# Patient Record
Sex: FEMALE | Race: WHITE | NOT HISPANIC OR LATINO | Employment: FULL TIME | URBAN - METROPOLITAN AREA
[De-identification: names, ages, dates, MRNs, and addresses within clinical notes are randomized per-mention and may not be internally consistent; named-entity substitution may affect disease eponyms.]

---

## 2017-08-16 ENCOUNTER — APPOINTMENT (OUTPATIENT)
Dept: ADMISSIONS | Facility: MEDICAL CENTER | Age: 37
End: 2017-08-16
Attending: OTOLARYNGOLOGY
Payer: COMMERCIAL

## 2017-08-16 RX ORDER — MONTELUKAST SODIUM 10 MG/1
10 TABLET ORAL DAILY
COMMUNITY

## 2017-08-16 RX ORDER — ACETAMINOPHEN 325 MG/1
650 TABLET ORAL EVERY 4 HOURS PRN
Status: ON HOLD | COMMUNITY
End: 2017-08-23

## 2017-08-16 RX ORDER — DIPHENHYDRAMINE HCL 25 MG
25 TABLET ORAL EVERY 6 HOURS PRN
COMMUNITY

## 2017-08-16 RX ORDER — RANITIDINE 150 MG/1
150 TABLET ORAL DAILY
COMMUNITY

## 2017-08-23 ENCOUNTER — HOSPITAL ENCOUNTER (OUTPATIENT)
Facility: MEDICAL CENTER | Age: 37
End: 2017-08-23
Attending: OTOLARYNGOLOGY | Admitting: OTOLARYNGOLOGY
Payer: COMMERCIAL

## 2017-08-23 VITALS
TEMPERATURE: 97.8 F | RESPIRATION RATE: 16 BRPM | DIASTOLIC BLOOD PRESSURE: 58 MMHG | BODY MASS INDEX: 27.67 KG/M2 | OXYGEN SATURATION: 94 % | HEIGHT: 62 IN | SYSTOLIC BLOOD PRESSURE: 93 MMHG | WEIGHT: 150.35 LBS | HEART RATE: 81 BPM

## 2017-08-23 PROBLEM — J34.2 DEVIATED SEPTUM: Status: ACTIVE | Noted: 2017-08-23

## 2017-08-23 LAB
B-HCG FREE SERPL-ACNC: <5 MIU/ML
IHCGL IHCGL: NEGATIVE MIU/ML

## 2017-08-23 PROCEDURE — 501838 HCHG SUTURE GENERAL: Performed by: OTOLARYNGOLOGY

## 2017-08-23 PROCEDURE — 700101 HCHG RX REV CODE 250

## 2017-08-23 PROCEDURE — 502692 HCHG DRESSING, NASOPORE 8CM: Performed by: OTOLARYNGOLOGY

## 2017-08-23 PROCEDURE — 160048 HCHG OR STATISTICAL LEVEL 1-5: Performed by: OTOLARYNGOLOGY

## 2017-08-23 PROCEDURE — 160009 HCHG ANES TIME/MIN: Performed by: OTOLARYNGOLOGY

## 2017-08-23 PROCEDURE — 700102 HCHG RX REV CODE 250 W/ 637 OVERRIDE(OP)

## 2017-08-23 PROCEDURE — 160036 HCHG PACU - EA ADDL 30 MINS PHASE I: Performed by: OTOLARYNGOLOGY

## 2017-08-23 PROCEDURE — 500331 HCHG COTTONOID, SURG PATTIE: Performed by: OTOLARYNGOLOGY

## 2017-08-23 PROCEDURE — 160029 HCHG SURGERY MINUTES - 1ST 30 MINS LEVEL 4: Performed by: OTOLARYNGOLOGY

## 2017-08-23 PROCEDURE — A9270 NON-COVERED ITEM OR SERVICE: HCPCS

## 2017-08-23 PROCEDURE — 500125 HCHG BOVIE, HANDLE: Performed by: OTOLARYNGOLOGY

## 2017-08-23 PROCEDURE — 500076 HCHG BLADE, CURVED 4.0: Performed by: OTOLARYNGOLOGY

## 2017-08-23 PROCEDURE — 160041 HCHG SURGERY MINUTES - EA ADDL 1 MIN LEVEL 4: Performed by: OTOLARYNGOLOGY

## 2017-08-23 PROCEDURE — 84702 CHORIONIC GONADOTROPIN TEST: CPT

## 2017-08-23 PROCEDURE — 160002 HCHG RECOVERY MINUTES (STAT): Performed by: OTOLARYNGOLOGY

## 2017-08-23 PROCEDURE — 160035 HCHG PACU - 1ST 60 MINS PHASE I: Performed by: OTOLARYNGOLOGY

## 2017-08-23 PROCEDURE — 700111 HCHG RX REV CODE 636 W/ 250 OVERRIDE (IP)

## 2017-08-23 PROCEDURE — 501404 HCHG SPLINT, NASAL DOYLE AIRWAY: Performed by: OTOLARYNGOLOGY

## 2017-08-23 PROCEDURE — 502573 HCHG PACK, ENT: Performed by: OTOLARYNGOLOGY

## 2017-08-23 RX ORDER — BACITRACIN ZINC 500 [USP'U]/G
OINTMENT TOPICAL
Status: DISCONTINUED
Start: 2017-08-23 | End: 2017-08-23 | Stop reason: HOSPADM

## 2017-08-23 RX ORDER — BACITRACIN ZINC 500 [USP'U]/G
OINTMENT TOPICAL
Status: DISCONTINUED | OUTPATIENT
Start: 2017-08-23 | End: 2017-08-23 | Stop reason: HOSPADM

## 2017-08-23 RX ORDER — AMOXICILLIN 500 MG/1
500 CAPSULE ORAL 3 TIMES DAILY
Qty: 21 CAP | Refills: 0 | Status: SHIPPED | OUTPATIENT
Start: 2017-08-23

## 2017-08-23 RX ORDER — OXYCODONE HCL 5 MG/5 ML
SOLUTION, ORAL ORAL
Status: COMPLETED
Start: 2017-08-23 | End: 2017-08-23

## 2017-08-23 RX ORDER — ONDANSETRON 4 MG/1
4 TABLET, ORALLY DISINTEGRATING ORAL EVERY 6 HOURS PRN
Qty: 10 TAB | Refills: 1 | Status: SHIPPED | OUTPATIENT
Start: 2017-08-23

## 2017-08-23 RX ORDER — ONDANSETRON 2 MG/ML
INJECTION INTRAMUSCULAR; INTRAVENOUS
Status: COMPLETED
Start: 2017-08-23 | End: 2017-08-23

## 2017-08-23 RX ORDER — HYDROCODONE BITARTRATE AND ACETAMINOPHEN 7.5; 325 MG/1; MG/1
1-2 TABLET ORAL EVERY 4 HOURS PRN
Qty: 62 TAB | Refills: 0 | Status: SHIPPED | OUTPATIENT
Start: 2017-08-23

## 2017-08-23 RX ORDER — LIDOCAINE HYDROCHLORIDE AND EPINEPHRINE 10; 10 MG/ML; UG/ML
INJECTION, SOLUTION INFILTRATION; PERINEURAL
Status: DISCONTINUED | OUTPATIENT
Start: 2017-08-23 | End: 2017-08-23 | Stop reason: HOSPADM

## 2017-08-23 RX ORDER — SODIUM CHLORIDE, SODIUM LACTATE, POTASSIUM CHLORIDE, CALCIUM CHLORIDE 600; 310; 30; 20 MG/100ML; MG/100ML; MG/100ML; MG/100ML
INJECTION, SOLUTION INTRAVENOUS CONTINUOUS
Status: DISCONTINUED | OUTPATIENT
Start: 2017-08-23 | End: 2017-08-23 | Stop reason: HOSPADM

## 2017-08-23 RX ORDER — HALOPERIDOL 5 MG/ML
INJECTION INTRAMUSCULAR
Status: COMPLETED
Start: 2017-08-23 | End: 2017-08-23

## 2017-08-23 RX ADMIN — ONDANSETRON 4 MG: 2 INJECTION INTRAMUSCULAR; INTRAVENOUS at 17:30

## 2017-08-23 RX ADMIN — OXYCODONE HYDROCHLORIDE 10 MG: 5 SOLUTION ORAL at 18:00

## 2017-08-23 RX ADMIN — FENTANYL CITRATE 50 MCG: 50 INJECTION, SOLUTION INTRAMUSCULAR; INTRAVENOUS at 17:30

## 2017-08-23 RX ADMIN — FENTANYL CITRATE 50 MCG: 50 INJECTION, SOLUTION INTRAMUSCULAR; INTRAVENOUS at 18:15

## 2017-08-23 RX ADMIN — HALOPERIDOL LACTATE 1 MG: 5 INJECTION, SOLUTION INTRAMUSCULAR at 19:00

## 2017-08-23 ASSESSMENT — PAIN SCALES - GENERAL
PAINLEVEL_OUTOF10: 0
PAINLEVEL_OUTOF10: 5
PAINLEVEL_OUTOF10: 0
PAINLEVEL_OUTOF10: 2
PAINLEVEL_OUTOF10: 4
PAINLEVEL_OUTOF10: 5
PAINLEVEL_OUTOF10: 5
PAINLEVEL_OUTOF10: 4
PAINLEVEL_OUTOF10: 4

## 2017-08-23 NOTE — IP AVS SNAPSHOT
8/23/2017    Estrella Nick  [address]  [facility] [address]    Dear Estrella:    [facility] wants to ensure your discharge home is safe and you or your loved ones have had all of your questions answered regarding your care after you leave the hospital.    Below is a list of resources and contact information should you have any questions regarding your hospital stay, follow-up instructions, or active medical symptoms.    Questions or Concerns Regarding… Contact   Medical Questions Related to Your Discharge  (7 days a week, 8am-5pm) Contact a Nurse Care Coordinator   [phone]   Medical Questions Not Related to Your Discharge  (24 hours a day / 7 days a week)  Contact the Nurse Health Line   [phone]    Medications or Discharge Instructions Refer to your discharge packet   or contact your [facility] Primary Care Provider   [phone]   Follow-up Appointment(s) Schedule your appointment via [employer]   or contact Scheduling [phone]   Billing Review your statement via [employer]  or contact Billing [phone]   Medical Records Review your records via [employer]   or contact Medical Records [phone]     You may receive a telephone call within two days of discharge. This call is to make certain you understand your discharge instructions and have the opportunity to have any questions answered. You can also easily access your medical information, test results and upcoming appointments via the [employer] free online health management tool. You can learn more and sign up at [email or web address]. For assistance setting up your [employer] account, please call [phone].    Once again, we want to ensure your discharge home is safe and that you have a clear understanding of any next steps in your care. If you have any questions or concerns, please do not hesitate to contact us, we are here for you. Thank you for choosing [facility] for your healthcare needs.    Sincerely,    Your [facility] Healthcare Team

## 2017-08-23 NOTE — IP AVS SNAPSHOT
" Home Care Instructions                                                                                                                Name:Estrella Nick  Medical Record Number:[id number]  CSN: [id number]    YOB: 1980   Age: 36 y.o.  Sex: female  HT:1.575 m (5' 2\") WT: 68.2 kg (150 lb 5.7 oz)          Admit Date: 8/23/2017     Discharge Date:   Today's Date: 8/23/2017  Attending Doctor:  Lisa Robertson M.D.                  Allergies:  Sulfa drugs                Discharge Instructions         ACTIVITY: Rest and take it easy for the first 24 hours.  A responsible adult is recommended to remain with you during that time.  It is normal to feel sleepy.  We encourage you to not do anything that requires balance, judgment or coordination.    MILD FLU-LIKE SYMPTOMS ARE NORMAL. YOU MAY EXPERIENCE GENERALIZED MUSCLE ACHES, THROAT IRRITATION, HEADACHE AND/OR SOME NAUSEA.    FOR 24 HOURS DO NOT:  Drive, operate machinery or run household appliances.  Drink beer or alcoholic beverages.   Make important decisions or sign legal documents.    SPECIAL INSTRUCTIONS: *Refer to Dr. Robertson's discharge instructions**    DIET: To avoid nausea, slowly advance diet as tolerated, avoiding spicy or greasy foods for the first day.  Add more substantial food to your diet according to your physician's instructions.  Babies can be fed formula or breast milk as soon as they are hungry.  INCREASE FLUIDS AND FIBER TO AVOID CONSTIPATION.    SURGICAL DRESSING/BATHING: *May shower in 24 hours**    FOLLOW-UP APPOINTMENT:  A follow-up appointment should be arranged with your doctor in *as scheduled**.    You should CALL YOUR PHYSICIAN if you develop:  Fever greater than 101 degrees F.  Pain not relieved by medication, or persistent nausea or vomiting.  Excessive bleeding (blood soaking through dressing) or unexpected drainage from the wound.  Extreme redness or swelling around the incision site, drainage of pus or foul smelling " drainage.  Inability to urinate or empty your bladder within 8 hours.  Problems with breathing or chest pain.    You should call 911 if you develop problems with breathing or chest pain.  If you are unable to contact your doctor or surgical center, you should go to the nearest emergency room or urgent care center.    Physician's telephone #: *Dr. Robertson [phone]**    If any questions arise, call your doctor.  If your doctor is not available, please feel free to call the Surgical Center at [phone].  The Center is open Monday through Friday from 7AM to 7PM.  You can also call the [employer] HOTLINE open 24 hours/day, 7 days/week and speak to a nurse at [phone], or toll free at [phone].    A registered nurse may call you a few days after your surgery to see how you are doing after your procedure.    MEDICATIONS: Resume taking daily medication.  Take prescribed pain medication with food.  If no medication is prescribed, you may take non-aspirin pain medication if needed.  PAIN MEDICATION CAN BE VERY CONSTIPATING.  Take a stool softener or laxative such as senokot, pericolace, or milk of magnesia if needed.    Prescription given for *patient has prescriptions**.  Last pain medication given at *6:00 pm; next dose due at 10:00 pm**.    If your physician has prescribed pain medication that includes Acetaminophen (Tylenol), do not take additional Acetaminophen (Tylenol) while taking the prescribed medication.    Depression / Suicide Risk    As you are discharged from this [facility] facility, it is important to learn how to keep safe from harming yourself.    Recognize the warning signs:  · Abrupt changes in personality, positive or negative- including increase in energy   · Giving away possessions  · Change in eating patterns- significant weight changes-  positive or negative  · Change in sleeping patterns- unable to sleep or sleeping all the time   · Unwillingness or inability to  communicate  · Depression  · Unusual sadness, discouragement and loneliness  · Talk of wanting to die  · Neglect of personal appearance   · Rebelliousness- reckless behavior  · Withdrawal from people/activities they love  · Confusion- inability to concentrate     If you or a loved one observes any of these behaviors or has concerns about self-harm, here's what you can do:  · Talk about it- your feelings and reasons for harming yourself  · Remove any means that you might use to hurt yourself (examples: pills, rope, extension cords, firearm)  · Get professional help from the community (Mental Health, Substance Abuse, psychological counseling)  · Do not be alone:Call your Safe Contact- someone whom you trust who will be there for you.  · Call your local CRISIS HOTLINE 921-9245 or 379-176-5165  · Call your local Children's Mobile Crisis Response Team [place] (103) 738-7369 or www.[employer]  · Call the toll free National Suicide Prevention Hotlines   · National Suicide Prevention Lifeline 889-758-MXSO (3525)  · National Hope Line Network 800-SUICIDE (869-5355)       Medication List      START taking these medications        Instructions    Morning Afternoon Evening Bedtime    amoxicillin 500 MG Caps   Commonly known as:  AMOXIL        Take 1 Cap by mouth 3 times a day.   Dose:  500 mg                        hydrocodone-acetaminophen 7.5-325 MG per tablet   Commonly known as:  NORCO        Take 1-2 Tabs by mouth every four hours as needed.   Dose:  1-2 Tab                        ondansetron 4 MG Tbdp   Commonly known as:  ZOFRAN ODT        Take 1 Tab by mouth every 6 hours as needed for Nausea/Vomiting.   Dose:  4 mg                          CONTINUE taking these medications        Instructions    Morning Afternoon Evening Bedtime    diphenhydrAMINE 25 MG Tabs   Commonly known as:  BENADRYL        Take 25 mg by mouth every 6 hours as needed for Sleep.   Dose:  25 mg                        FABB PO        Take   by mouth.                        FLONASE 50 MCG/ACT nasal spray   Generic drug:  fluticasone        Spray 1 Spray in nose every day. Each Nostril   Dose:  1 Spray                        montelukast 10 MG Tabs   Commonly known as:  SINGULAIR        Take 10 mg by mouth every day.   Dose:  10 mg                        ranitidine 150 MG Tabs   Commonly known as:  ZANTAC        Take 150 mg by mouth every day.   Dose:  150 mg                        SYNTHROID 50 MCG Tabs   Generic drug:  levothyroxine        Take 50 mcg by mouth every day.   Dose:  50 mcg                        Vitamin D3 04458 units Tabs        Take 1 Tab by mouth every 72 hours.   Dose:  1 Tab                          STOP taking these medications     acetaminophen 325 MG Tabs   Commonly known as:  TYLENOL                    Where to Get Your Medications      You can get these medications from any pharmacy     Bring a paper prescription for each of these medications    - amoxicillin 500 MG Caps  - hydrocodone-acetaminophen 7.5-325 MG per tablet  - ondansetron 4 MG Tbdp            Medication Information     Above and/or attached are the medications your physician expects you to take upon discharge. Review all of your home medications and newly ordered medications with your doctor and/or pharmacist. Follow medication instructions as directed by your doctor and/or pharmacist. Please keep your medication list with you and share with your physician. Update the information when medications are discontinued, doses are changed, or new medications (including over-the-counter products) are added; and carry medication information at all times in the event of emergency situations.        Resources     Quit Smoking / Tobacco Use:    I understand the use of any tobacco products increases my chance of suffering from future heart disease or stroke and could cause other illnesses which may shorten my life. Quitting the use of tobacco products is the single most important  thing I can do to improve my health. For further information on smoking / tobacco cessation call a Toll Free Quit Line at 1-926.346.9568 (*National [employer] [place]) or 1-709.142.2366 (American Lung Association) or you can access the web based program at www.lungusa.org.    [place] Tobacco Users Help Line:  (486) 894-9917       Toll Free: 1-583.681.7278  Quit Tobacco Program [facility] (285)823-7592    Crisis Hotline:    [place] Crisis Hotline:  3-733-DAYSXTR or 1-980.529.1860    [place] Crisis Hotline:    1-720.985.7471 or 825-665-7593    Discharge Survey:   Thank you for choosing [facility]. We hope we did everything we could to make your hospital stay a pleasant one. You may be receiving a survey and we would appreciate your time and participation in answering the questions. Your input is very valuable to us in our efforts to improve our service to our patients and their families.            Signatures     My signature on this form indicates that:    1. I acknowledge receipt and understanding of these Home Care Instruction.  2. My questions regarding this information have been answered to my satisfaction.  3. I have formulated a plan with my discharge nurse to obtain my prescribed medications for home.    __________________________________      __________________________________                   Patient Signature                                 Guardian/Responsible Adult Signature      __________________________________                 __________       ________                       Nurse Signature                                               Date                 Time

## 2017-08-24 NOTE — OR SURGEON
Operative Report    PreOp Diagnosis: Right maxillary and ethmoid sinusitis, septal deviation, turbinated hypertrophy    PostOp Diagnosis: Same    Procedure(s):  ETHMOIDECTOMY-ENDOSCOPIC ANTERIOR  - Wound Class: Clean Contaminated  ANTROSTOMY - ENDOSCOPIC MAXILLARY  - Wound Class: Clean Contaminated  SEPTOPLASTY - Wound Class: Clean Contaminated  TURBINOPLASTY - Wound Class: Clean Contaminated    Surgeon(s):  Lisa Robertson M.D.    Anesthesiologist/Type of Anesthesia:  Anesthesiologist: Vincent Ro M.D./General    Surgical Staff:  Circulator: Eliane Horner R.N.  Scrub Person: Yandy Hicks    Specimens:  * No specimens in log *    Estimated Blood Loss: minimal    Findings: right septal deviation    Complications: none        8/23/2017 5:24 PM Lisa Robertson

## 2017-08-24 NOTE — DISCHARGE INSTRUCTIONS
ACTIVITY: Rest and take it easy for the first 24 hours.  A responsible adult is recommended to remain with you during that time.  It is normal to feel sleepy.  We encourage you to not do anything that requires balance, judgment or coordination.    MILD FLU-LIKE SYMPTOMS ARE NORMAL. YOU MAY EXPERIENCE GENERALIZED MUSCLE ACHES, THROAT IRRITATION, HEADACHE AND/OR SOME NAUSEA.    FOR 24 HOURS DO NOT:  Drive, operate machinery or run household appliances.  Drink beer or alcoholic beverages.   Make important decisions or sign legal documents.    SPECIAL INSTRUCTIONS: *Refer to Dr. Robertson's discharge instructions**    DIET: To avoid nausea, slowly advance diet as tolerated, avoiding spicy or greasy foods for the first day.  Add more substantial food to your diet according to your physician's instructions.  Babies can be fed formula or breast milk as soon as they are hungry.  INCREASE FLUIDS AND FIBER TO AVOID CONSTIPATION.    SURGICAL DRESSING/BATHING: *May shower in 24 hours**    FOLLOW-UP APPOINTMENT:  A follow-up appointment should be arranged with your doctor in *as scheduled**.    You should CALL YOUR PHYSICIAN if you develop:  Fever greater than 101 degrees F.  Pain not relieved by medication, or persistent nausea or vomiting.  Excessive bleeding (blood soaking through dressing) or unexpected drainage from the wound.  Extreme redness or swelling around the incision site, drainage of pus or foul smelling drainage.  Inability to urinate or empty your bladder within 8 hours.  Problems with breathing or chest pain.    You should call 911 if you develop problems with breathing or chest pain.  If you are unable to contact your doctor or surgical center, you should go to the nearest emergency room or urgent care center.    Physician's telephone #: *Dr. Robertson [phone]**    If any questions arise, call your doctor.  If your doctor is not available, please feel free to call the Surgical Center at [phone].  The  Center is open Monday through Friday from 7AM to 7PM.  You can also call the HEALTH HOTLINE open 24 hours/day, 7 days/week and speak to a nurse at [phone], or toll free at [phone].    A registered nurse may call you a few days after your surgery to see how you are doing after your procedure.    MEDICATIONS: Resume taking daily medication.  Take prescribed pain medication with food.  If no medication is prescribed, you may take non-aspirin pain medication if needed.  PAIN MEDICATION CAN BE VERY CONSTIPATING.  Take a stool softener or laxative such as senokot, pericolace, or milk of magnesia if needed.    Prescription given for *patient has prescriptions**.  Last pain medication given at *6:00 pm; next dose due at 10:00 pm**.    If your physician has prescribed pain medication that includes Acetaminophen (Tylenol), do not take additional Acetaminophen (Tylenol) while taking the prescribed medication.    Depression / Suicide Risk    As you are discharged from this [facility] facility, it is important to learn how to keep safe from harming yourself.    Recognize the warning signs:  · Abrupt changes in personality, positive or negative- including increase in energy   · Giving away possessions  · Change in eating patterns- significant weight changes-  positive or negative  · Change in sleeping patterns- unable to sleep or sleeping all the time   · Unwillingness or inability to communicate  · Depression  · Unusual sadness, discouragement and loneliness  · Talk of wanting to die  · Neglect of personal appearance   · Rebelliousness- reckless behavior  · Withdrawal from people/activities they love  · Confusion- inability to concentrate     If you or a loved one observes any of these behaviors or has concerns about self-harm, here's what you can do:  · Talk about it- your feelings and reasons for harming yourself  · Remove any means that you might use to hurt yourself (examples: pills, rope, extension cords,  firearm)  · Get professional help from the community (Mental Health, Substance Abuse, psychological counseling)  · Do not be alone:Call your Safe Contact- someone whom you trust who will be there for you.  · Call your local CRISIS HOTLINE 216-2408 or 557-479-0935  · Call your local Children's Mobile Crisis Response Team [place] (606) 555-7250 or www.[employer]  · Call the toll free National Suicide Prevention Hotlines   · National Suicide Prevention Lifeline 113-387-GCWX (6392)  · National Hope Line Network 800-SUICIDE (783-6670)

## 2017-08-24 NOTE — OR NURSING
REPORT FROM FRANCO DAVIS.  PATIENT UP TO BATHROOM.  VOID WITHOUT PROBLEM.  DRESSED AND IN RECLINER.  MINIMAL OOZING FROM NOSE.  DISCHARGE INSTRUCTIONS TO PATIENT AND .  PATIENT FEELS READY FOR DISCHARGE.  ALL QUESTIONS ANSWERED.

## 2017-08-24 NOTE — OR NURSING
1708 Received pt from OR, received report from Dr. Ro. Pt sleeping, oral airway in place. No bleeding observed from nose. VS WNL.     1710 Oral airway dc'd without difficulty.     1730 Pt medicated with zofran for nausea and fent for pain 6/10 to sinuses. Nasal sling applied with gauze for scant bleeding from nose.     1755 Pt medicated with oxy for pain 6/10 to sinuses. Pt.'s nasal dressing changed, moderate bleeding observed.     1815 Pt medicated with fent for pain to sinuses 6/10, nasal dressing changed for moderate sanguinous drainage.     1900 Pt medicated with haldol for nausea.     1905 Report to Gabe DAVIS.

## 2017-08-24 NOTE — OP REPORT
DATE OF SERVICE:  08/23/2017    PREOPERATIVE DIAGNOSES:  Maxillary and ethmoid sinusitis, septal deviation and   turbinate hypertrophy.    POSTOPERATIVE DIAGNOSES:  Maxillary and ethmoid sinusitis, septal deviation   and turbinate hypertrophy.    PROCEDURE:  Septoturbinoplasty and anterior ethmoidectomy with right maxillary   antrostomy.    SURGEON:  Lisa Robertson MD    ANESTHESIOLOGIST:  Vincent Ro MD    COMPLICATIONS:  None.    PROCEDURE:  The patient was properly identified and taken to the operating   room where she was laid in supine position.  General anesthesia was induced   and endotracheal tube was placed.  The patient was then positioned in reverse   Trendelenburg.  Inspection of her nose showed severe septal deviation to the   right with large turbinates bilaterally.  At this time, 1% lidocaine was   injected in the anterior septum bilaterally as well as the inferior turbinate   and the middle meatus on the right side.  A total of approximately 4-5 mL was   used, after which cocaine pledgets were placed bilaterally.  After prepping   and draping the patient and allowing for local time, the pledgets were   removed.  An anterior septal incision was made on the left anterior septum and   taken down to the perichondrium using a 15 blade.  A subperichondrial flap   elevated using a freer and a Cecile elevator.  Crossover incision was made using   a 15 blade about 2 cm posterior to the caudal end of the cartilage crossed   over into the right side of the cartilage, elevating the perichondrial flap   off the cartilage and going all the way back to the bony vomer.  Pieces of cartilage on the   floor of the nose were elevated and released, freeing up the septum nicely.  Attention was then turned to the right middle meatus.    The middle turbinate was mediallized and the uncinate process was pulled forward.  The anterior ethmoids were then   also entered using a straight [place] using microdebrider.  This  area was   then widely opened.  She does have a small retention cyst in the   right maxillary sinus, which was removed using a 45                      Blakesley.  The area was then irrigated and suctioned.  Microdebrider was then   used to remove the inferior portion of the inferior turbinates                     using a 0-degree telescope for both the sinus and the turbinates.  Suction   cautery was then used to stop any bleeding seen on the inferior turbinates.    Nasal port was placed in the right middle meatus and along the floor of the   nose.  The anterior septum was closed using interrupted 4-0 chromic and then   the septum was quilted closed using a running 4-0 chromic.  Wilson splints were   placed bilaterally and secured in position using a 3-0 nylon suture.  The   mouth was irrigated and suctioned.  The patient was unprepped and draped,   awakened, extubated, returned to recovery room in stable satisfactory   condition.       ____________________________________     MD ANGELINA Khalil / YAIMA    DD:  08/24/2017 07:55:58  DT:  08/24/2017 08:32:03    D#:  [id number]  Job#:  [id number]

## 2018-01-23 ENCOUNTER — APPOINTMENT (RX ONLY)
Dept: URBAN - METROPOLITAN AREA CLINIC 31 | Facility: CLINIC | Age: 38
Setting detail: DERMATOLOGY
End: 2018-01-23

## 2018-01-23 DIAGNOSIS — L73.2 HIDRADENITIS SUPPURATIVA: ICD-10-CM

## 2018-01-23 DIAGNOSIS — L57.8 OTHER SKIN CHANGES DUE TO CHRONIC EXPOSURE TO NONIONIZING RADIATION: ICD-10-CM

## 2018-01-23 DIAGNOSIS — L81.4 OTHER MELANIN HYPERPIGMENTATION: ICD-10-CM

## 2018-01-23 DIAGNOSIS — L30.1 DYSHIDROSIS [POMPHOLYX]: ICD-10-CM

## 2018-01-23 DIAGNOSIS — D22 MELANOCYTIC NEVI: ICD-10-CM

## 2018-01-23 DIAGNOSIS — L21.8 OTHER SEBORRHEIC DERMATITIS: ICD-10-CM

## 2018-01-23 DIAGNOSIS — D18.0 HEMANGIOMA: ICD-10-CM

## 2018-01-23 PROBLEM — D22.5 MELANOCYTIC NEVI OF TRUNK: Status: ACTIVE | Noted: 2018-01-23

## 2018-01-23 PROBLEM — D18.01 HEMANGIOMA OF SKIN AND SUBCUTANEOUS TISSUE: Status: ACTIVE | Noted: 2018-01-23

## 2018-01-23 PROCEDURE — ? ADDITIONAL NOTES

## 2018-01-23 PROCEDURE — ? PRESCRIPTION

## 2018-01-23 PROCEDURE — ? OBSERVATION AND MEASURE

## 2018-01-23 PROCEDURE — 99213 OFFICE O/P EST LOW 20 MIN: CPT

## 2018-01-23 PROCEDURE — ? COUNSELING

## 2018-01-23 RX ORDER — DOXYCYCLINE HYCLATE 100 MG/1
CAPSULE, GELATIN COATED ORAL
Qty: 60 | Refills: 4 | Status: ERX | COMMUNITY
Start: 2018-01-23

## 2018-01-23 RX ADMIN — DOXYCYCLINE HYCLATE: 100 CAPSULE, GELATIN COATED ORAL at 23:11

## 2018-01-23 ASSESSMENT — LOCATION ZONE DERM
LOCATION ZONE: VULVA
LOCATION ZONE: SCALP
LOCATION ZONE: ARM
LOCATION ZONE: FACE
LOCATION ZONE: TRUNK
LOCATION ZONE: FEET

## 2018-01-23 ASSESSMENT — LOCATION SIMPLE DESCRIPTION DERM
LOCATION SIMPLE: ABDOMEN
LOCATION SIMPLE: RIGHT FOREARM
LOCATION SIMPLE: RIGHT SCALP
LOCATION SIMPLE: LEFT LOWER BACK
LOCATION SIMPLE: RIGHT CHEEK
LOCATION SIMPLE: LEFT FOREARM
LOCATION SIMPLE: GROIN
LOCATION SIMPLE: CHEST
LOCATION SIMPLE: LEFT CHEEK
LOCATION SIMPLE: LEFT PLANTAR SURFACE
LOCATION SIMPLE: RIGHT LOWER BACK

## 2018-01-23 ASSESSMENT — LOCATION DETAILED DESCRIPTION DERM
LOCATION DETAILED: RIGHT SUPERIOR MEDIAL MIDBACK
LOCATION DETAILED: LEFT DISTAL DORSAL FOREARM
LOCATION DETAILED: LEFT MEDIAL PLANTAR MIDFOOT
LOCATION DETAILED: RIGHT INFERIOR CENTRAL MALAR CHEEK
LOCATION DETAILED: LEFT INFERIOR MEDIAL MIDBACK
LOCATION DETAILED: RIGHT DISTAL DORSAL FOREARM
LOCATION DETAILED: LEFT INFERIOR CENTRAL MALAR CHEEK
LOCATION DETAILED: LEFT PROXIMAL DORSAL FOREARM
LOCATION DETAILED: UPPER STERNUM
LOCATION DETAILED: RIGHT MEDIAL FRONTAL SCALP
LOCATION DETAILED: STERNAL NOTCH
LOCATION DETAILED: MONS PUBIS
LOCATION DETAILED: RIGHT PROXIMAL DORSAL FOREARM
LOCATION DETAILED: LEFT RIB CAGE

## 2018-01-23 NOTE — PROCEDURE: ADDITIONAL NOTES
Additional Notes: Probable diagnosis for concern on intake
Additional Notes: Advised on alert alternating 2 different types of dandruff shampoo. If patient decides to consider steroid solution instead she is to call office for rx.

## 2018-01-24 RX ORDER — CLOBETASOL PROPIONATE 0.5 MG/G
OINTMENT TOPICAL
Qty: 1 | Refills: 0 | Status: ERX | COMMUNITY
Start: 2018-01-24

## 2018-01-24 RX ADMIN — CLOBETASOL PROPIONATE: 0.5 OINTMENT TOPICAL at 00:49

## 2021-01-06 ENCOUNTER — OFFICE VISIT (OUTPATIENT)
Dept: URGENT CARE | Facility: CLINIC | Age: 41
End: 2021-01-06
Payer: COMMERCIAL

## 2021-01-06 VITALS — HEIGHT: 62 IN | WEIGHT: 160 LBS | BODY MASS INDEX: 29.44 KG/M2

## 2021-09-14 ENCOUNTER — HOSPITAL ENCOUNTER (OUTPATIENT)
Dept: LAB | Facility: MEDICAL CENTER | Age: 41
End: 2021-09-14
Attending: PHYSICIAN ASSISTANT
Payer: COMMERCIAL

## 2021-09-14 LAB
T4 FREE SERPL-MCNC: 1.33 NG/DL (ref 0.93–1.7)
TSH SERPL DL<=0.005 MIU/L-ACNC: 2.05 UIU/ML (ref 0.38–5.33)

## 2021-09-14 PROCEDURE — 36415 COLL VENOUS BLD VENIPUNCTURE: CPT

## 2021-09-14 PROCEDURE — 84443 ASSAY THYROID STIM HORMONE: CPT

## 2021-09-14 PROCEDURE — 84439 ASSAY OF FREE THYROXINE: CPT

## 2023-10-19 ENCOUNTER — OFFICE VISIT (OUTPATIENT)
Dept: OPHTHALMOLOGY | Facility: MEDICAL CENTER | Age: 43
End: 2023-10-19
Payer: COMMERCIAL

## 2023-10-19 DIAGNOSIS — H57.11 RETRO-ORBITAL PAIN OF RIGHT EYE: ICD-10-CM

## 2023-10-19 DIAGNOSIS — H53.9 VISUAL DISTURBANCE: ICD-10-CM

## 2023-10-19 DIAGNOSIS — E07.9 THYROID DISEASE: ICD-10-CM

## 2023-10-19 PROCEDURE — 99204 OFFICE O/P NEW MOD 45 MIN: CPT | Mod: 25 | Performed by: STUDENT IN AN ORGANIZED HEALTH CARE EDUCATION/TRAINING PROGRAM

## 2023-10-19 PROCEDURE — 92083 EXTENDED VISUAL FIELD XM: CPT | Performed by: STUDENT IN AN ORGANIZED HEALTH CARE EDUCATION/TRAINING PROGRAM

## 2023-10-19 PROCEDURE — 92250 FUNDUS PHOTOGRAPHY W/I&R: CPT | Performed by: STUDENT IN AN ORGANIZED HEALTH CARE EDUCATION/TRAINING PROGRAM

## 2023-10-19 RX ORDER — GABAPENTIN 100 MG/1
CAPSULE ORAL
COMMUNITY
Start: 2023-09-22

## 2023-10-19 RX ORDER — ESCITALOPRAM OXALATE 20 MG/1
TABLET ORAL
COMMUNITY
Start: 2023-10-02

## 2023-10-19 RX ORDER — TRIAMCINOLONE ACETONIDE 55 UG/1
1 SPRAY, METERED NASAL DAILY
COMMUNITY

## 2023-10-19 ASSESSMENT — CONF VISUAL FIELD
OS_NORMAL: 1
OS_SUPERIOR_NASAL_RESTRICTION: 0
OS_SUPERIOR_TEMPORAL_RESTRICTION: 0
OS_INFERIOR_NASAL_RESTRICTION: 0
OS_INFERIOR_TEMPORAL_RESTRICTION: 0
OD_SUPERIOR_NASAL_RESTRICTION: 0
OD_SUPERIOR_TEMPORAL_RESTRICTION: 0
OD_NORMAL: 1
OD_INFERIOR_TEMPORAL_RESTRICTION: 0
OD_INFERIOR_NASAL_RESTRICTION: 0

## 2023-10-19 ASSESSMENT — TONOMETRY
OD_IOP_MMHG: 14
IOP_METHOD: I-CARE
OS_IOP_MMHG: 13

## 2023-10-19 ASSESSMENT — MARGIN REFLEX DISTANCE
OD_MRD1: 4
OS_MRD2: 5
OS_MRD1: 4
OD_MRD2: 5

## 2023-10-19 ASSESSMENT — REFRACTION_WEARINGRX
OS_SPHERE: -2.50
OD_AXIS: 094
OD_CYLINDER: +0.75
OD_SPHERE: +0.25
OS_AXIS: 166
OS_CYLINDER: -0.50
SPECS_TYPE: SVL

## 2023-10-19 ASSESSMENT — VISUAL ACUITY
CORRECTION_TYPE: GLASSES
METHOD: SNELLEN - LINEAR
OD_SC: J1+
OD_CC: 20/20
OS_CC+: -1
OS_CC: 20/20
OS_SC: 20/30
OS_SC: J1+
OD_SC: 20/25

## 2023-10-19 ASSESSMENT — REFRACTION_MANIFEST
OD_AXIS: 003
OD_SPHERE: -0.25
OD_CYLINDER: +0.75
OS_CYLINDER: +1.25
METHOD_AUTOREFRACTION: 1
OS_AXIS: 005
OS_SPHERE: -0.50

## 2023-10-19 ASSESSMENT — CUP TO DISC RATIO
OD_RATIO: 0.5
OS_RATIO: 0.5

## 2023-10-19 ASSESSMENT — SLIT LAMP EXAM - LIDS
COMMENTS: NORMAL
COMMENTS: NORMAL

## 2023-10-19 ASSESSMENT — ENCOUNTER SYMPTOMS
DIZZINESS: 1
EYE PAIN: 1
BLURRED VISION: 1
HEADACHES: 1

## 2023-10-19 NOTE — ASSESSMENT & PLAN NOTE
Reports R retrorbital pain beginning 2 months prior  Pt reports history of migraine headaches which are typically posterior occipital.     Exam demonstrates no resistance to retropulsion, proptosis, and EOM restriction. CTH reported normal.     Discussed with patient that given normal examination, possible R retro-orbital pain is a new migraine presentation. Pt pending MRI brain. Will follow up with Neurology

## 2023-10-19 NOTE — PROCEDURES
OD: non specific misses  VFI 99%  MD -1.54  PSD 1.58    OS: non specific misses, borderline reliability  VFI 97%  MD -3.25  PSD 2.04

## 2023-10-19 NOTE — ASSESSMENT & PLAN NOTE
History of hypothyroid on synthroid 50  TSH 2.05  FT4 1.33  Thyroid stimulating immunoglobulin 101  -stable , no evidence of thyroid orbitopathy

## 2023-10-19 NOTE — ASSESSMENT & PLAN NOTE
Pt reports blurred vision OU with R retrorbital eye pain. Given history of hypothyroid she was referred for evaluation of thyroid orbitopathy. Blurred vision reported at distance only. No double vision. Reports redness and swelling of eyes, however positive for allergies and gritty sensation in eyes.     Exam: normal afferent and efferent function. 20/20 VA with correction at distance. HVF demonstrates non specific misses OS, however borderline reliability. RNFL 84 OD and 79 OS (mild thinning temporally otherwise normal). No Apd or exam findings to be suggestive for optic neuropathy OS. External exam does not demonstrate increased resistance to retropulsion, eyelid edema, proptosis.     Plan:   -Discussed with patient that examination is not concerning for thyroid orbitopathy at this time. R retrorbital pain may be migraine headache. distance visual acuity  20/20 OU. no findings concerning for optic neuropathy at this time. -Episodes of swelling and conjunctival injection may be secondary to allergies given gritty sensation and history  -Continue headache management with neurology  -Artificial tears routinely and Pataday prn   -RTC in 6 months to assess stability, sooner if symptoms develop

## 2023-10-19 NOTE — PROGRESS NOTES
Peds/Neuro Ophthalmology:   Kenyon Oconnor M.D.    Date & Time note created:    10/19/2023   1:45 PM     Referring MD / APRN:  Kt Ace M.D., No att. providers found    Patient ID:  Name:             Estrella Nick   YOB: 1980  Age:                 42 y.o.  female   MRN:               [id number]    Chief Complaint/Reason for Visit:     Thyroid Problem (New patient evaluation for both eyes)      History of Present Illness:    Estrella Nick is a 42 y.o. female   New patient for Thyroid Eye Diease.     Pt reports 2 months of R retrorbital pain and blurred vision OU. The blurred vision is only at distance. She is able to read fine at near. She was seen by ENT who initially ordered a CTH. She was then referred to Neurology. MRI brain has been ordered for 11/17/23. Patient feels her eyes are more gritty, puffy and irrigated than normal. Pt states her eye are always dry, watery, itchy, red, and burn. She does use Refresh and Pataday which does not seem to help her symptoms. She does feel there is a change in her appearance. She denies any double vision. She has computer glasses however she feels this makes her more nauseous.     Patient reports chronic headaches located in the posterior occipital region. She has recently seen a neurologist who ordered a MRI brain. The right retro-orbital pain is new over the past 2 months     Dr Eliane Pollard Endocrinology- stable thyroid function          Review of Systems:  Review of Systems   Eyes:  Positive for blurred vision and pain.        ROSSY in both eyes  Dry eyes OU   Neurological:  Positive for dizziness and headaches.       Past Medical History:   Past Medical History:   Diagnosis Date    Allergy     Chronic sinusitis     Hemorrhagic disorder (HCC)     bleeding post tonsils     Hx of angioedema     Thyroid disease        Past Surgical History:  Past Surgical History:   Procedure Laterality Date    ETHMOIDECTOMY Right 8/23/2017    Procedure:  ETHMOIDECTOMY-ENDOSCOPIC ANTERIOR ;  Surgeon: Lisa Robertson M.D.;  Location: SURGERY SAME DAY [facility];  Service:     ANTROSTOMY Right 8/23/2017    Procedure: ANTROSTOMY - ENDOSCOPIC MAXILLARY ;  Surgeon: Lisa Robertson M.D.;  Location: SURGERY SAME DAY [facility];  Service:     SEPTOPLASTY Right 8/23/2017    Procedure: SEPTOPLASTY;  Surgeon: Lisa Robertson M.D.;  Location: SURGERY SAME DAY [facility];  Service:     TURBINOPLASTY Bilateral 8/23/2017    Procedure: TURBINOPLASTY;  Surgeon: Lisa Robertson M.D.;  Location: SURGERY SAME DAY [facility];  Service:     HERNIA REPAIR  3/2013    CHOLECYSTECTOMY  2013    TONSILLECTOMY  7/2010    LAPAROSCOPY  2007    EYE SURGERY  2006    laser surgery on both eyes       Current Outpatient Medications:  Current Outpatient Medications   Medication Sig Dispense Refill    gabapentin (NEURONTIN) 100 MG Cap       triamcinolone (NASACORT) 55 MCG/ACT nasal inhaler Administer 1 Spray into each nostril every day.      escitalopram (LEXAPRO) 20 MG tablet       diphenhydrAMINE (BENADRYL) 25 MG Tab Take 25 mg by mouth every 6 hours as needed for Sleep.      montelukast (SINGULAIR) 10 MG Tab Take 10 mg by mouth every day.      Folic Acid-Vit B6-Vit B12 (FABB PO) Take  by mouth.      Cholecalciferol (VITAMIN D3) 82251 UNITS Tab Take 1 Tab by mouth every 72 hours.      levothyroxine (SYNTHROID) 50 MCG TABS Take 50 mcg by mouth every day.      methylPREDNISolone (MEDROL DOSEPAK) 4 MG Tablet Therapy Pack Follow schedule on package instructions. 21 Tablet 0    methocarbamol (ROBAXIN) 750 MG Tab Take 1 Tablet by mouth 3 times a day as needed (muscle spasm). (Patient not taking: Reported on 10/19/2023) 50 Tablet 0    amoxicillin (AMOXIL) 500 MG Cap Take 1 Cap by mouth 3 times a day. (Patient not taking: Reported on 10/19/2023) 21 Cap 0    ondansetron (ZOFRAN ODT) 4 MG TABLET DISPERSIBLE Take 1 Tab by mouth every 6 hours as needed for Nausea/Vomiting. (Patient not  taking: Reported on 10/19/2023) 10 Tab 1    hydrocodone-acetaminophen (NORCO) 7.5-325 MG per tablet Take 1-2 Tabs by mouth every four hours as needed. (Patient not taking: Reported on 10/19/2023) 62 Tab 0    ranitidine (ZANTAC) 150 MG Tab Take 150 mg by mouth every day. (Patient not taking: Reported on 10/19/2023)      fluticasone (FLONASE) 50 MCG/ACT nasal spray Spray 1 Spray in nose every day. Each Nostril (Patient not taking: Reported on 10/19/2023)       No current facility-administered medications for this visit.       Allergies:  Allergies   Allergen Reactions    Sulfabenzamide Anaphylaxis    Cephalexin      Other reaction(s): Not available    Nitrofurantoin Unspecified    Sulfa Drugs Anaphylaxis and Rash     Other reaction(s): Unknown       Family History:  Family History   Problem Relation Age of Onset    Hypertension Mother        Social History:  Social History     Socioeconomic History    Marital status:      Spouse name: Not on file    Number of children: Not on file    Years of education: Not on file    Highest education level: Not on file   Occupational History    Not on file   Tobacco Use    Smoking status: Never    Smokeless tobacco: Never   Substance and Sexual Activity    Alcohol use: Yes     Comment: 2 drinks per week     Drug use: No    Sexual activity: Yes   Other Topics Concern    Not on file   Social History Narrative    Not on file     Social Determinants of Health     Financial Resource Strain: Not on file   Food Insecurity: Not on file   Transportation Needs: Not on file   Physical Activity: Not on file   Stress: Not on file   Social Connections: Not on file   Intimate Partner Violence: Not on file   Housing Stability: Not on file          Physical Exam:  Physical Exam    Oriented x 3  Weight/BMI: There is no height or weight on file to calculate BMI.  There were no vitals taken for this visit.    Base Eye Exam       Visual Acuity (Snellen - Linear)         Right Left    Dist sc  20/25 20/30    Dist cc 20/20 20/20 -1    Near sc J1+ J1+      Correction: Glasses              Tonometry ([facility], 1:15 PM)         Right Left    Pressure 14 13              Pupils         Pupils Dark Light Shape React APD    Right PERRL 4 2 Round Brisk None    Left PERRL 4 2 Round Brisk None              Visual Fields         Right Left     Full Full              Extraocular Movement         Right Left     Full, Ortho Full, Ortho              Neuro/Psych       Oriented x3: Yes    Mood/Affect: Normal                  Additional Tests       Color         Right Left    Ishihara 12/12 12/12              Stereo       Fly: +    Animals: 3/3    Circles: 7/9                  Slit Lamp and Fundus Exam       External Exam         Right Left    External Normal, normal resistance to retropulsion Normal, normal resistance to retropulsion    MRD1 4 mm 4 mm    MRD2 5 mm 5 mm              Exophthalmometry (Base: 117 mm)         Right Left    Leydi 19 mm 18 mm              Slit Lamp Exam         Right Left    Lids/Lashes Normal Normal    Conjunctiva/Sclera White and quiet White and quiet    Cornea Clear Clear    Anterior Chamber Deep and quiet Deep and quiet    Iris Round and reactive Round and reactive    Lens Clear Clear              Fundus Exam         Right Left    Disc Normal Normal    C/D Ratio 0.5 0.5    Macula Normal Normal                  Refraction       Wearing Rx         Sphere Cylinder Axis    Right +0.25 +0.75 094    Left -2.50 -0.50 166      Age: 1yr    Type: SVL              Manifest Refraction (Auto)         Sphere Cylinder Axis    Right -0.25 +0.75 003    Left -0.50 +1.25 005                    Pertinent Lab/Test/Imaging Review:   MRI brain 3/20/2016: no acute intracranial abnormality     Assessment and Plan:     Thyroid disease  History of hypothyroid on synthroid 50  TSH 2.05  FT4 1.33  Thyroid stimulating immunoglobulin 101  -stable , no evidence of thyroid orbitopathy         Kenyon Oconnor M.D.   97

## 2024-02-21 ENCOUNTER — HOSPITAL ENCOUNTER (OUTPATIENT)
Dept: RADIOLOGY | Facility: MEDICAL CENTER | Age: 44
End: 2024-02-21
Attending: OBSTETRICS & GYNECOLOGY
Payer: COMMERCIAL

## 2024-02-21 DIAGNOSIS — R10.2 PELVIC PAIN SYNDROME: ICD-10-CM

## 2024-02-21 PROCEDURE — 76830 TRANSVAGINAL US NON-OB: CPT

## 2024-04-25 ENCOUNTER — APPOINTMENT (OUTPATIENT)
Dept: OPHTHALMOLOGY | Facility: MEDICAL CENTER | Age: 44
End: 2024-04-25
Payer: COMMERCIAL

## 2024-05-07 ENCOUNTER — APPOINTMENT (OUTPATIENT)
Dept: OPHTHALMOLOGY | Facility: MEDICAL CENTER | Age: 44
End: 2024-05-07
Payer: COMMERCIAL

## 2024-05-07 DIAGNOSIS — G43.709 CHRONIC MIGRAINE WITHOUT AURA WITHOUT STATUS MIGRAINOSUS, NOT INTRACTABLE: ICD-10-CM

## 2024-05-07 DIAGNOSIS — E07.9 THYROID DISEASE: ICD-10-CM

## 2024-05-07 ASSESSMENT — ENCOUNTER SYMPTOMS
HEADACHES: 1
BLURRED VISION: 1
EYE PAIN: 1
DIZZINESS: 1

## 2024-05-07 ASSESSMENT — CONF VISUAL FIELD
OD_INFERIOR_TEMPORAL_RESTRICTION: 0
OS_SUPERIOR_TEMPORAL_RESTRICTION: 0
OD_SUPERIOR_TEMPORAL_RESTRICTION: 0
OD_INFERIOR_NASAL_RESTRICTION: 0
OD_NORMAL: 1
OD_SUPERIOR_NASAL_RESTRICTION: 0
OS_INFERIOR_TEMPORAL_RESTRICTION: 0
OS_NORMAL: 1
OS_INFERIOR_NASAL_RESTRICTION: 0
OS_SUPERIOR_NASAL_RESTRICTION: 0

## 2024-05-07 ASSESSMENT — VISUAL ACUITY
OD_CC: 20/20
METHOD: SNELLEN - LINEAR
OD_CC+: -1
OS_CC: 20/15

## 2024-05-07 ASSESSMENT — TONOMETRY
OS_IOP_MMHG: 12
OD_IOP_MMHG: 14
IOP_METHOD: ICARE

## 2024-05-07 ASSESSMENT — LAGOPHTHALMOS
OS_LAGOPHTHALMOS: 0
OD_LAGOPHTHALMOS: 0

## 2024-05-07 ASSESSMENT — CUP TO DISC RATIO
OS_RATIO: 0.5
OD_RATIO: 0.5

## 2024-05-07 ASSESSMENT — SLIT LAMP EXAM - LIDS
COMMENTS: NORMAL
COMMENTS: NORMAL

## 2024-05-07 NOTE — PROGRESS NOTES
Peds/Neuro Ophthalmology:   Kenyon Oconnor M.D.    Date & Time note created:    5/7/2024   3:59 PM     Referring MD / APRN:  Kt Ace M.D., No att. providers found    Patient ID:  Name:             Estrella Nick   YOB: 1980  Age:                 42 y.o.  female   MRN:               [id number]    Chief Complaint/Reason for Visit:     Follow-Up (6 mo fv for thgyroid eye disease. )      History of Present Illness:      Estrella Nick is a 42 y.o. woman who presents for follow up of possible thyroid eye disease. She was last seen 10/19/23    At her last appt, exam was normal with no evidence of thyroid orbitopathy. She denies any issues with redness of the eyes, proptosis, pain with eye movements, blurred vision, double vision. Patient has continued to struggle with headaches and neck pain. Due to her persistent neck pain patient underwent myelogram (unable to get MRI due to braces), which demonstrated some disc bulge. She experienced post LP headaches requiring a blood patch.    Onset: 4-6 months ago  Frequency: daily  Characteristic: Generalized pressure  Severity: 6/10  Associated symptoms: +light/sound sensitivity, nausea,+R occipital tenderness, +neck pain   Abortive: Imitrex prn, Meloxicam 2 days a week  Preventive: Magnesium 100-200mg daily    As a recap,   Pt reports 2 months of R retrorbital pain and blurred vision OU. The blurred vision is only at distance. She is able to read fine at near. She was seen by ENT who initially ordered a CTH. She was then referred to Neurology. MRI brain has been ordered for 11/17/23. Patient feels her eyes are more gritty, puffy and irrigated than normal. Pt states her eye are always dry, watery, itchy, red, and burn. She does use Refresh and Pataday which does not seem to help her symptoms. She does feel there is a change in her appearance. She denies any double vision. She has computer glasses however she feels this makes her more nauseous.      Patient reports chronic headaches located in the posterior occipital region. She has recently seen a neurologist who ordered a MRI brain. The right retro-orbital pain is new over the past 2 months     Dr Eliane Pollard Endocrinology- stable thyroid function              Review of Systems:  Review of Systems   Eyes:  Positive for blurred vision and pain.        ROSSY in both eyes  Dry eyes OU   Neurological:  Positive for dizziness and headaches.       Past Medical History:   Past Medical History:   Diagnosis Date    Allergy     Chronic sinusitis     Hemorrhagic disorder (HCC)     bleeding post tonsils     Hx of angioedema     Thyroid disease        Past Surgical History:  Past Surgical History:   Procedure Laterality Date    ETHMOIDECTOMY Right 8/23/2017    Procedure: ETHMOIDECTOMY-ENDOSCOPIC ANTERIOR ;  Surgeon: Lisa Robertson M.D.;  Location: SURGERY SAME DAY [facility];  Service:     ANTROSTOMY Right 8/23/2017    Procedure: ANTROSTOMY - ENDOSCOPIC MAXILLARY ;  Surgeon: Lisa Robertson M.D.;  Location: SURGERY SAME DAY [facility];  Service:     SEPTOPLASTY Right 8/23/2017    Procedure: SEPTOPLASTY;  Surgeon: Lisa Robertson M.D.;  Location: SURGERY SAME DAY [facility];  Service:     TURBINOPLASTY Bilateral 8/23/2017    Procedure: TURBINOPLASTY;  Surgeon: Lisa Robertson M.D.;  Location: SURGERY SAME DAY [facility];  Service:     HERNIA REPAIR  3/2013    CHOLECYSTECTOMY  2013    TONSILLECTOMY  7/2010    LAPAROSCOPY  2007    EYE SURGERY  2006    laser surgery on both eyes       Current Outpatient Medications:  Current Outpatient Medications   Medication Sig Dispense Refill    meloxicam (MOBIC) 15 MG tablet TAKE ONE TAB PO ONCE A DAY PRN FOR PAIN 30 Tablet 0    gabapentin (NEURONTIN) 100 MG Cap       triamcinolone (NASACORT) 55 MCG/ACT nasal inhaler Administer 1 Spray into each nostril every day.      escitalopram (LEXAPRO) 20 MG tablet       methylPREDNISolone (MEDROL DOSEPAK) 4 MG Tablet  Therapy Pack Follow schedule on package instructions. 21 Tablet 0    methocarbamol (ROBAXIN) 750 MG Tab Take 1 Tablet by mouth 3 times a day as needed (muscle spasm). (Patient not taking: Reported on 10/19/2023) 50 Tablet 0    amoxicillin (AMOXIL) 500 MG Cap Take 1 Cap by mouth 3 times a day. (Patient not taking: Reported on 10/19/2023) 21 Cap 0    ondansetron (ZOFRAN ODT) 4 MG TABLET DISPERSIBLE Take 1 Tab by mouth every 6 hours as needed for Nausea/Vomiting. (Patient not taking: Reported on 10/19/2023) 10 Tab 1    hydrocodone-acetaminophen (NORCO) 7.5-325 MG per tablet Take 1-2 Tabs by mouth every four hours as needed. (Patient not taking: Reported on 10/19/2023) 62 Tab 0    diphenhydrAMINE (BENADRYL) 25 MG Tab Take 25 mg by mouth every 6 hours as needed for Sleep.      montelukast (SINGULAIR) 10 MG Tab Take 10 mg by mouth every day.      ranitidine (ZANTAC) 150 MG Tab Take 150 mg by mouth every day. (Patient not taking: Reported on 10/19/2023)      Folic Acid-Vit B6-Vit B12 (FABB PO) Take  by mouth.      Cholecalciferol (VITAMIN D3) 18384 UNITS Tab Take 1 Tab by mouth every 72 hours.      levothyroxine (SYNTHROID) 50 MCG TABS Take 50 mcg by mouth every day.      fluticasone (FLONASE) 50 MCG/ACT nasal spray Spray 1 Spray in nose every day. Each Nostril (Patient not taking: Reported on 10/19/2023)       No current facility-administered medications for this visit.       Allergies:  Allergies   Allergen Reactions    Sulfabenzamide Anaphylaxis    Cephalexin      Other reaction(s): Not available    Nitrofurantoin Unspecified    Sulfa Drugs Anaphylaxis and Rash     Other reaction(s): Unknown       Family History:  Family History   Problem Relation Age of Onset    Hypertension Mother        Social History:  Social History     Socioeconomic History    Marital status:      Spouse name: Not on file    Number of children: Not on file    Years of education: Not on file    Highest education level: Not on file    Occupational History    Not on file   Tobacco Use    Smoking status: Never    Smokeless tobacco: Never   Substance and Sexual Activity    Alcohol use: Yes     Comment: 2 drinks per week     Drug use: No    Sexual activity: Yes   Other Topics Concern    Not on file   Social History Narrative    Not on file     Social Determinants of Health     Financial Resource Strain: Not on file   Food Insecurity: Not on file   Transportation Needs: Not on file   Physical Activity: Not on file   Stress: Not on file   Social Connections: Not on file   Intimate Partner Violence: Not on file   Housing Stability: Not on file          Physical Exam:  Physical Exam    Oriented x 3  Weight/BMI: There is no height or weight on file to calculate BMI.  There were no vitals taken for this visit.    Base Eye Exam       Visual Acuity (Snellen - Linear)         Right Left    Dist cc 20/20 -1 20/15              Tonometry ([facility], 1:51 PM)         Right Left    Pressure 14 12              Pupils         Dark Light Shape React APD    Right 4 3 Round Brisk None    Left 4 3 Round Brisk None              Visual Fields         Right Left     Full Full              Extraocular Movement         Right Left     Full, Ortho Full, Ortho              Neuro/Psych       Oriented x3: Yes    Mood/Affect: Normal   + left occipital notch tenderness                 Additional Tests       Color         Right Left    Ishihara 11/11 11/11              Stereo       Fly: +    Animals: 3/3    Circles: 4/9                  Slit Lamp and Fundus Exam       External Exam         Right Left    External Normal, normal resistance to retropulsion Normal, normal resistance to retropulsion    Superior scleral show 0 mm 0 mm    Inferior scleral show 0 mm 0 mm    Lagophthalmos 0 mm 0 mm              Slit Lamp Exam         Right Left    Lids/Lashes Normal Normal    Conjunctiva/Sclera White and quiet White and quiet    Cornea Clear Clear    Anterior Chamber Deep and quiet Deep and  quiet    Iris Round and reactive Round and reactive    Lens Clear Clear              Fundus Exam         Right Left    Disc pink, sharp disc margins, flat pink, sharp disc margins, flat    C/D Ratio 0.5 0.5    Macula Normal Normal                    Pertinent Lab/Test/Imaging Review:  MRI brain 3/20/2016: no acute intracranial abnormality     TSH 2.05  FT4 1.33  Thyroid stimulating immunoglobulin 101    Assessment and Plan:     Thyroid disease  History of hypothyroid on synthroid 50  TSH 2.05  FT4 1.33  Thyroid stimulating immunoglobulin 101  JACI 0    Plan:   -stable , no evidence of thyroid orbitopathy     Chronic migraine without aura without status migrainosus, not intractable  Onset: 4-6 months ago  Frequency: daily  Characteristic: Generalized pressure  Severity: 6/10  Associated symptoms: +light/sound sensitivity, nausea,+R occipital tenderness, +neck pain   Abortive: Imitrex prn   Preventive: Magnesium 100-200mg daily  Meloxicam 2 days a week    Plan:   Discussed with patient current headaches are consistent with migraine headache rather than post LP. Recommend preventative therapy such as Magnesium 400-500mg daily or Riboflavin. Pt is currently following with pain management. Given tenderness on palpation of L occipital notch, could consider occipital nerve block. Muscle relaxants may additionally be helpful with cervicalgia.     -RTC in 1 year          Kenyon Oconnor M.D.

## 2024-05-07 NOTE — ASSESSMENT & PLAN NOTE
History of hypothyroid on synthroid 50  TSH 2.05  FT4 1.33  Thyroid stimulating immunoglobulin 101  JACI 0    Plan:   -stable , no evidence of thyroid orbitopathy

## 2024-05-07 NOTE — PATIENT INSTRUCTIONS
"Vitamins and herbs that show potential for migraine prevention:    Magnesium: Magnesium (250 mg twice a day or 500 mg at bed) has a relaxant effect on smooth muscles such as blood vessels. Individuals suffering from frequent or daily headache usually have low magnesium levels which can be increase with daily supplementation of 400-750 mg. Three trials found 40-90% average headache reduction  when used as a preventative. Magnesium also demonstrated the benefit in menstrually related migraine.  Magnesium is part of the messenger system in the serotonin cascade and it is a good muscle relaxant.  It is also useful for constipation which can be a side effect of other medications used to treat migraine. Good sources include nuts, whole grains, and tomatoes.     Riboflavin (vitamin B 2) 200 mg twice a day. This vitamin assists nerve cells in the production of ATP a principal energy storing molecule.  It is necessary for many chemical reactions in the body.  There have been at least 3 clinical trials of riboflavin using 400 mg per day all of which suggested that migraine frequency can be decreased.  All 3 trials showed significant improvement in over half of migraine sufferers.  Food Sources: cereal grasses, whole grains, almonds, sesame seeds, spinach, fortified soy milk, spirulina, mushrooms, beet greens, quinoa, buckwheat, prunes. Most Americans get more riboflavin than the recommended daily allowance, however riboflavin deficiency is not necessary for the supplements to help prevent headache.     Coenzyme Q10: This is present in almost all cells in the body and is critical component for the conversion of energy.  Recent studies have shown that a nutritional supplement of CoQ10 can reduce the frequency of migraine attacks by improving the energy production of cells as with riboflavin.  Doses of 150 mg twice a day have been shown to be effective.       BLUE LIGHT GLASSES  FL-41 \"Blue Light\" Filter - Migraine and " "Photophobia Protection Glasses  Light sensitivity or \"photophobia\" is a common problem in people with migraines, especially during a migraine attack. You can also see this problem following head injuries, like in concussion. Bright lights, especially lights that are heavy in the \"blue\" spectrum, are common triggers for migraines and are used in significant amounts in TVs, fluorescent lights, LED lights and headlamps, computers and phone screens. Specialized lenses can help block that \"blue\" light and reduce your light sensitivity and help prevent a migraine. Not all migraineurs have the same triggers or respond the same to the blue light, so it is difficult to know whether or not you will respond to the use of the F-41 filter lenses. If you chose to try glasses with the F-41 filter, we can recommend a couple of reputable sites that sell the lenses. Less expensive and equally effective lenses may be available from additional sites, although at this time, we are unable to comment on their reliability.    [employer]ww.[employer].com  Www.[employer].com     In an effort to better understand the effectiveness of these lenses, please report your positive or negative results to us for future reference. Thank you.     "

## 2024-05-07 NOTE — ASSESSMENT & PLAN NOTE
Onset: 4-6 months ago  Frequency: daily  Characteristic: Generalized pressure  Severity: 6/10  Associated symptoms: +light/sound sensitivity, nausea,+R occipital tenderness, +neck pain   Abortive: Imitrex prn   Preventive: Magnesium 100-200mg daily  Meloxicam 2 days a week    Plan:   Discussed with patient current headaches are consistent with migraine headache rather than post LP. Recommend preventative therapy such as Magnesium 400-500mg daily or Riboflavin. Pt is currently following with pain management. Given tenderness on palpation of L occipital notch, could consider occipital nerve block. Muscle relaxants may additionally be helpful with cervicalgia.     -RTC in 1 year

## 2024-06-17 ENCOUNTER — HOSPITAL ENCOUNTER (OUTPATIENT)
Dept: LAB | Facility: MEDICAL CENTER | Age: 44
End: 2024-06-17
Attending: INTERNAL MEDICINE
Payer: COMMERCIAL

## 2024-06-17 LAB
ALBUMIN SERPL BCP-MCNC: 4.4 G/DL (ref 3.2–4.9)
ALBUMIN/GLOB SERPL: 1.3 G/DL
ALP SERPL-CCNC: 114 U/L (ref 30–99)
ALT SERPL-CCNC: 30 U/L (ref 2–50)
ANION GAP SERPL CALC-SCNC: 13 MMOL/L (ref 7–16)
AST SERPL-CCNC: 21 U/L (ref 12–45)
BASOPHILS # BLD AUTO: 0.4 % (ref 0–1.8)
BASOPHILS # BLD: 0.03 K/UL (ref 0–0.12)
BILIRUB SERPL-MCNC: 0.3 MG/DL (ref 0.1–1.5)
BUN SERPL-MCNC: 12 MG/DL (ref 8–22)
CALCIUM ALBUM COR SERPL-MCNC: 9.2 MG/DL (ref 8.5–10.5)
CALCIUM SERPL-MCNC: 9.5 MG/DL (ref 8.5–10.5)
CHLORIDE SERPL-SCNC: 101 MMOL/L (ref 96–112)
CO2 SERPL-SCNC: 23 MMOL/L (ref 20–33)
CREAT SERPL-MCNC: 0.56 MG/DL (ref 0.5–1.4)
CRP SERPL HS-MCNC: 0.66 MG/DL (ref 0–0.75)
EOSINOPHIL # BLD AUTO: 0.52 K/UL (ref 0–0.51)
EOSINOPHIL NFR BLD: 7.8 % (ref 0–6.9)
ERYTHROCYTE [DISTWIDTH] IN BLOOD BY AUTOMATED COUNT: 43.2 FL (ref 35.9–50)
ERYTHROCYTE [SEDIMENTATION RATE] IN BLOOD BY WESTERGREN METHOD: 11 MM/HOUR (ref 0–25)
GFR SERPLBLD CREATININE-BSD FMLA CKD-EPI: 115 ML/MIN/1.73 M 2
GLOBULIN SER CALC-MCNC: 3.3 G/DL (ref 1.9–3.5)
GLUCOSE SERPL-MCNC: 109 MG/DL (ref 65–99)
HCT VFR BLD AUTO: 44.7 % (ref 37–47)
HGB BLD-MCNC: 14.9 G/DL (ref 12–16)
IMM GRANULOCYTES # BLD AUTO: 0.02 K/UL (ref 0–0.11)
IMM GRANULOCYTES NFR BLD AUTO: 0.3 % (ref 0–0.9)
LYMPHOCYTES # BLD AUTO: 1.33 K/UL (ref 1–4.8)
LYMPHOCYTES NFR BLD: 19.9 % (ref 22–41)
MCH RBC QN AUTO: 30.2 PG (ref 27–33)
MCHC RBC AUTO-ENTMCNC: 33.3 G/DL (ref 32.2–35.5)
MCV RBC AUTO: 90.5 FL (ref 81.4–97.8)
MONOCYTES # BLD AUTO: 0.63 K/UL (ref 0–0.85)
MONOCYTES NFR BLD AUTO: 9.4 % (ref 0–13.4)
NEUTROPHILS # BLD AUTO: 4.14 K/UL (ref 1.82–7.42)
NEUTROPHILS NFR BLD: 62.2 % (ref 44–72)
NRBC # BLD AUTO: 0 K/UL
NRBC BLD-RTO: 0 /100 WBC (ref 0–0.2)
PLATELET # BLD AUTO: 259 K/UL (ref 164–446)
PMV BLD AUTO: 9.7 FL (ref 9–12.9)
POTASSIUM SERPL-SCNC: 3.9 MMOL/L (ref 3.6–5.5)
PROT SERPL-MCNC: 7.7 G/DL (ref 6–8.2)
RBC # BLD AUTO: 4.94 M/UL (ref 4.2–5.4)
SODIUM SERPL-SCNC: 137 MMOL/L (ref 135–145)
WBC # BLD AUTO: 6.7 K/UL (ref 4.8–10.8)

## 2024-06-17 PROCEDURE — 85025 COMPLETE CBC W/AUTO DIFF WBC: CPT

## 2024-06-17 PROCEDURE — 83088 ASSAY OF HISTAMINE: CPT

## 2024-06-17 PROCEDURE — 83520 IMMUNOASSAY QUANT NOS NONAB: CPT

## 2024-06-17 PROCEDURE — 80053 COMPREHEN METABOLIC PANEL: CPT

## 2024-06-17 PROCEDURE — 84150 ASSAY OF PROSTAGLANDIN: CPT

## 2024-06-17 PROCEDURE — 86140 C-REACTIVE PROTEIN: CPT

## 2024-06-17 PROCEDURE — 85652 RBC SED RATE AUTOMATED: CPT

## 2024-06-17 PROCEDURE — 82785 ASSAY OF IGE: CPT

## 2024-06-17 PROCEDURE — 82542 COL CHROMOTOGRAPHY QUAL/QUAN: CPT | Mod: 91

## 2024-06-17 PROCEDURE — 36415 COLL VENOUS BLD VENIPUNCTURE: CPT

## 2024-06-19 LAB
HISTAMINE SERPL-SCNC: 47 NMOL/L (ref 0–8)
IGE SERPL-ACNC: 44 KU/L
TRYPTASE SERPL-MCNC: 6 UG/L

## 2024-06-25 LAB
COLLECT DURATION TIME UR: 24 H
CREAT UR-MCNC: 70 MG/DL
CREAT URINE MG DAY Q4367: 1295 MG/24H (ref 603–1783)
ME-HISTAMINE/CREAT 24H UR: 126 MCG/G CR (ref 30–200)
SPECIMEN VOL UR: 1850 ML

## 2024-06-28 LAB
2,3-DINOR 11B-PG F2A/CREAT 24H UR: 1743 PG/MG CR
COLLECTION PERIOD 8234: 24
CREAT 24H UR-MRATE: 1295 MG/24H (ref 603–1783)
LTE4/CREAT UR: NORMAL PG/MG CR
URINE CREATININE 40531: 70 MG/DL
URINE VOLUMNE 8233: 1850

## 2024-07-12 ENCOUNTER — HOSPITAL ENCOUNTER (OUTPATIENT)
Dept: LAB | Facility: MEDICAL CENTER | Age: 44
End: 2024-07-12
Attending: PHYSICIAN ASSISTANT
Payer: COMMERCIAL

## 2024-07-12 LAB
25(OH)D3 SERPL-MCNC: 23 NG/ML (ref 30–100)
ALBUMIN SERPL BCP-MCNC: 4.3 G/DL (ref 3.2–4.9)
ALBUMIN/GLOB SERPL: 1.5 G/DL
ALP SERPL-CCNC: 109 U/L (ref 30–99)
ALT SERPL-CCNC: 22 U/L (ref 2–50)
ANION GAP SERPL CALC-SCNC: 13 MMOL/L (ref 7–16)
AST SERPL-CCNC: 13 U/L (ref 12–45)
BASOPHILS # BLD AUTO: 0.4 % (ref 0–1.8)
BASOPHILS # BLD: 0.04 K/UL (ref 0–0.12)
BILIRUB SERPL-MCNC: 0.4 MG/DL (ref 0.1–1.5)
BUN SERPL-MCNC: 9 MG/DL (ref 8–22)
CALCIUM ALBUM COR SERPL-MCNC: 9.1 MG/DL (ref 8.5–10.5)
CALCIUM SERPL-MCNC: 9.3 MG/DL (ref 8.5–10.5)
CHLORIDE SERPL-SCNC: 99 MMOL/L (ref 96–112)
CO2 SERPL-SCNC: 24 MMOL/L (ref 20–33)
CORTIS SERPL-MCNC: 0.5 UG/DL (ref 0–23)
CREAT SERPL-MCNC: 0.46 MG/DL (ref 0.5–1.4)
CREAT UR-MCNC: 58.24 MG/DL
DHEA-S SERPL-MCNC: 55 UG/DL (ref 35.4–256)
EOSINOPHIL # BLD AUTO: 0.18 K/UL (ref 0–0.51)
EOSINOPHIL NFR BLD: 1.9 % (ref 0–6.9)
ERYTHROCYTE [DISTWIDTH] IN BLOOD BY AUTOMATED COUNT: 42.7 FL (ref 35.9–50)
EST. AVERAGE GLUCOSE BLD GHB EST-MCNC: 108 MG/DL
ESTRADIOL SERPL-MCNC: 51.6 PG/ML
FOLATE SERPL-MCNC: 4.4 NG/ML
FSH SERPL-ACNC: 14.8 MIU/ML
GFR SERPLBLD CREATININE-BSD FMLA CKD-EPI: 120 ML/MIN/1.73 M 2
GLOBULIN SER CALC-MCNC: 2.9 G/DL (ref 1.9–3.5)
GLUCOSE SERPL-MCNC: 111 MG/DL (ref 65–99)
HBA1C MFR BLD: 5.4 % (ref 4–5.6)
HCT VFR BLD AUTO: 43.8 % (ref 37–47)
HGB BLD-MCNC: 15 G/DL (ref 12–16)
IMM GRANULOCYTES # BLD AUTO: 0.05 K/UL (ref 0–0.11)
IMM GRANULOCYTES NFR BLD AUTO: 0.5 % (ref 0–0.9)
LH SERPL-ACNC: 17 IU/L
LYMPHOCYTES # BLD AUTO: 1.53 K/UL (ref 1–4.8)
LYMPHOCYTES NFR BLD: 16.4 % (ref 22–41)
MCH RBC QN AUTO: 31.2 PG (ref 27–33)
MCHC RBC AUTO-ENTMCNC: 34.2 G/DL (ref 32.2–35.5)
MCV RBC AUTO: 91.1 FL (ref 81.4–97.8)
MICROALBUMIN UR-MCNC: <1.2 MG/DL
MICROALBUMIN/CREAT UR: NORMAL MG/G (ref 0–30)
MONOCYTES # BLD AUTO: 0.56 K/UL (ref 0–0.85)
MONOCYTES NFR BLD AUTO: 6 % (ref 0–13.4)
NEUTROPHILS # BLD AUTO: 6.97 K/UL (ref 1.82–7.42)
NEUTROPHILS NFR BLD: 74.8 % (ref 44–72)
NRBC # BLD AUTO: 0 K/UL
NRBC BLD-RTO: 0 /100 WBC (ref 0–0.2)
PLATELET # BLD AUTO: 322 K/UL (ref 164–446)
PMV BLD AUTO: 9.8 FL (ref 9–12.9)
POTASSIUM SERPL-SCNC: 4 MMOL/L (ref 3.6–5.5)
PROT SERPL-MCNC: 7.2 G/DL (ref 6–8.2)
RBC # BLD AUTO: 4.81 M/UL (ref 4.2–5.4)
SODIUM SERPL-SCNC: 136 MMOL/L (ref 135–145)
T4 FREE SERPL-MCNC: 1.36 NG/DL (ref 0.93–1.7)
THYROPEROXIDASE AB SERPL-ACNC: 204 IU/ML (ref 0–9)
TSH SERPL DL<=0.005 MIU/L-ACNC: 0.82 UIU/ML (ref 0.38–5.33)
VIT B12 SERPL-MCNC: 858 PG/ML (ref 211–911)
WBC # BLD AUTO: 9.3 K/UL (ref 4.8–10.8)

## 2024-07-12 PROCEDURE — 83001 ASSAY OF GONADOTROPIN (FSH): CPT

## 2024-07-12 PROCEDURE — 84403 ASSAY OF TOTAL TESTOSTERONE: CPT

## 2024-07-12 PROCEDURE — 82627 DEHYDROEPIANDROSTERONE: CPT

## 2024-07-12 PROCEDURE — 84443 ASSAY THYROID STIM HORMONE: CPT

## 2024-07-12 PROCEDURE — 80299 QUANTITATIVE ASSAY DRUG: CPT

## 2024-07-12 PROCEDURE — 82670 ASSAY OF TOTAL ESTRADIOL: CPT

## 2024-07-12 PROCEDURE — 85025 COMPLETE CBC W/AUTO DIFF WBC: CPT

## 2024-07-12 PROCEDURE — 86376 MICROSOMAL ANTIBODY EACH: CPT

## 2024-07-12 PROCEDURE — 82043 UR ALBUMIN QUANTITATIVE: CPT

## 2024-07-12 PROCEDURE — 84270 ASSAY OF SEX HORMONE GLOBUL: CPT

## 2024-07-12 PROCEDURE — 82671 ASSAY OF ESTROGENS: CPT

## 2024-07-12 PROCEDURE — 84439 ASSAY OF FREE THYROXINE: CPT

## 2024-07-12 PROCEDURE — 82607 VITAMIN B-12: CPT

## 2024-07-12 PROCEDURE — 80053 COMPREHEN METABOLIC PANEL: CPT

## 2024-07-12 PROCEDURE — 83002 ASSAY OF GONADOTROPIN (LH): CPT

## 2024-07-12 PROCEDURE — 82157 ASSAY OF ANDROSTENEDIONE: CPT

## 2024-07-12 PROCEDURE — 82570 ASSAY OF URINE CREATININE: CPT

## 2024-07-12 PROCEDURE — 82024 ASSAY OF ACTH: CPT

## 2024-07-12 PROCEDURE — 82306 VITAMIN D 25 HYDROXY: CPT

## 2024-07-12 PROCEDURE — 82533 TOTAL CORTISOL: CPT

## 2024-07-12 PROCEDURE — 36415 COLL VENOUS BLD VENIPUNCTURE: CPT

## 2024-07-12 PROCEDURE — 82746 ASSAY OF FOLIC ACID SERUM: CPT

## 2024-07-12 PROCEDURE — 83036 HEMOGLOBIN GLYCOSYLATED A1C: CPT

## 2024-07-14 LAB — SHBG SERPL-SCNC: 40 NMOL/L (ref 25–122)

## 2024-07-15 LAB — TEST NAME 95000: NORMAL

## 2024-07-17 LAB
ANDROST SERPL-MCNC: 0.43 NG/ML (ref 0.13–0.82)
ESTRADIOL SERPL HS-MCNC: 41.2 PG/ML
ESTROGEN SERPL CALC-MCNC: 78.1 PG/ML
ESTRONE SERPL-MCNC: 36.9 PG/ML
TESTOST SERPL-MCNC: 17 NG/DL (ref 9–55)

## 2024-07-19 LAB — ACTH PLAS-MCNC: 2 PG/ML (ref 7.2–63.3)

## 2024-08-23 LAB — TEST NAME 95000: NORMAL

## 2025-06-09 ENCOUNTER — OFFICE VISIT (OUTPATIENT)
Dept: OPHTHALMOLOGY | Facility: MEDICAL CENTER | Age: 45
End: 2025-06-09
Payer: COMMERCIAL

## 2025-06-09 DIAGNOSIS — H04.123 DRY EYES: ICD-10-CM

## 2025-06-09 DIAGNOSIS — E07.9 THYROID DISEASE: ICD-10-CM

## 2025-06-09 DIAGNOSIS — H57.11 RETRO-ORBITAL PAIN OF RIGHT EYE: ICD-10-CM

## 2025-06-09 DIAGNOSIS — G43.709 CHRONIC MIGRAINE WITHOUT AURA WITHOUT STATUS MIGRAINOSUS, NOT INTRACTABLE: Primary | ICD-10-CM

## 2025-06-09 PROCEDURE — 92133 CPTRZD OPH DX IMG PST SGM ON: CPT | Performed by: STUDENT IN AN ORGANIZED HEALTH CARE EDUCATION/TRAINING PROGRAM

## 2025-06-09 PROCEDURE — 99213 OFFICE O/P EST LOW 20 MIN: CPT | Mod: 25 | Performed by: STUDENT IN AN ORGANIZED HEALTH CARE EDUCATION/TRAINING PROGRAM

## 2025-06-09 RX ORDER — ATOGEPANT 60 MG/1
TABLET ORAL
COMMUNITY

## 2025-06-09 RX ORDER — DULOXETIN HYDROCHLORIDE 60 MG/1
60 CAPSULE, DELAYED RELEASE ORAL DAILY
COMMUNITY

## 2025-06-09 ASSESSMENT — REFRACTION_MANIFEST
OD_AXIS: 169
OS_AXIS: 008
OS_SPHERE: -0.50
OD_SPHERE: -0.50
OD_CYLINDER: +0.50
METHOD_AUTOREFRACTION: 1
OS_CYLINDER: +1.00

## 2025-06-09 ASSESSMENT — SLIT LAMP EXAM - LIDS
COMMENTS: NORMAL
COMMENTS: NORMAL

## 2025-06-09 ASSESSMENT — VISUAL ACUITY
OD_SC: J1+
OS_SC: 20/25
OD_PH_SC: 20/20
OD_SC: 20/20-2
OD_PH_SC+: -1
OS_SC: J1+
OS_PH_SC: 20/20
METHOD: SNELLEN - LINEAR

## 2025-06-09 ASSESSMENT — CONF VISUAL FIELD
OD_SUPERIOR_NASAL_RESTRICTION: 0
OD_NORMAL: 1
OS_INFERIOR_NASAL_RESTRICTION: 0
OS_SUPERIOR_TEMPORAL_RESTRICTION: 0
OD_SUPERIOR_TEMPORAL_RESTRICTION: 0
OS_NORMAL: 1
OS_INFERIOR_TEMPORAL_RESTRICTION: 0
OD_INFERIOR_NASAL_RESTRICTION: 0
OD_INFERIOR_TEMPORAL_RESTRICTION: 0
OS_SUPERIOR_NASAL_RESTRICTION: 0

## 2025-06-09 ASSESSMENT — ENCOUNTER SYMPTOMS
DIZZINESS: 1
HEADACHES: 1
BLURRED VISION: 1
EYE PAIN: 1

## 2025-06-09 ASSESSMENT — CUP TO DISC RATIO
OD_RATIO: 0.5
OS_RATIO: 0.5

## 2025-06-09 ASSESSMENT — TONOMETRY
OS_IOP_MMHG: 13
OD_IOP_MMHG: 14

## 2025-06-09 NOTE — PROGRESS NOTES
Peds/Neuro Ophthalmology:   Kenyon Oconnor M.D.    Date & Time note created:    6/9/2025   12:02 PM     Referring MD / APRN:  Kt Ace M.D., No att. providers found    Patient ID:  Name:             Estrella Nick   YOB: 1980  Age:                 42 y.o.  female   MRN:               [id number]    Chief Complaint/Reason for Visit:     Other (ROSSY)      History of Present Illness:      Estrella Nick is a 42 y.o. woman who presents for follow up exam of R Retro orbital eye pain.     Estrella is doing well since her last evaluation. She was last seen 5/7/24. She reports her headaches and R retro orbital pain have nearly resolved on the Qulipita. She now experiences a headache a few times a month as opposed to daily. She has recently switched endocrinologists and is now seeing Dr Dao. She reports undergoing radiofrequency ablation and states her thyroid function is stable. Estrella does report concerns regarding blurred vision OU. Things do not seem as clear at all distances. Of note she lost her glasses a few months ago. She continues to have light sensitivity, grittiness and irritation of her eyes. She has been using AT TID .    As a recap,   Patient was initially referred for possible thryoid eye due to R retro orbital pain and blurred vision OU. Her initial exam was normal without evidence of thyroid orbitopathy. She denied any issues with redness of the eyes, proptosis, pain with eye movements, blurred vision, double vision. Patient has continued to struggle with headaches and neck pain. Due to her persistent neck pain neurology ordered a myelogram (unable to get MRI due to braces), which demonstrated some disc bulge. She experienced post LP headaches requiring a blood patch. Pt was then started on Qulipta by her neurologist    Onset: 4-6 months ago  Frequency: daily  Characteristic: Generalized pressure  Severity: 6/10  Associated symptoms: +light/sound sensitivity, nausea,+R  occipital tenderness, +neck pain   Abortive: Imitrex prn, Meloxicam 2 days a week  Preventive: Magnesium 100-200mg daily, Qulipta      Other  Associated symptoms include headaches.       Review of Systems:  Review of Systems   Eyes:  Positive for blurred vision and pain.        ROSSY in both eyes  Dry eyes OU   Neurological:  Positive for dizziness and headaches.   All other systems reviewed and are negative.      Past Medical History:   Past Medical History:   Diagnosis Date    Allergy     Chronic sinusitis     Hemorrhagic disorder (HCC)     bleeding post tonsils     Hx of angioedema     Thyroid disease        Past Surgical History:  Past Surgical History:   Procedure Laterality Date    ETHMOIDECTOMY Right 8/23/2017    Procedure: ETHMOIDECTOMY-ENDOSCOPIC ANTERIOR ;  Surgeon: Lisa Robertson M.D.;  Location: SURGERY SAME DAY [facility];  Service:     ANTROSTOMY Right 8/23/2017    Procedure: ANTROSTOMY - ENDOSCOPIC MAXILLARY ;  Surgeon: Lisa Robertson M.D.;  Location: SURGERY SAME DAY [facility];  Service:     SEPTOPLASTY Right 8/23/2017    Procedure: SEPTOPLASTY;  Surgeon: Lisa Robertson M.D.;  Location: SURGERY SAME DAY [facility];  Service:     TURBINOPLASTY Bilateral 8/23/2017    Procedure: TURBINOPLASTY;  Surgeon: Lisa Robertson M.D.;  Location: SURGERY SAME DAY [facility];  Service:     HERNIA REPAIR  3/2013    CHOLECYSTECTOMY  2013    TONSILLECTOMY  7/2010    LAPAROSCOPY  2007    EYE SURGERY  2006    laser surgery on both eyes       Current Outpatient Medications:  Current Outpatient Medications   Medication Sig Dispense Refill    DULoxetine (CYMBALTA) 60 MG Cap DR Particles delayed-release capsule Take 60 mg by mouth every day.      Atogepant (QULIPTA) 60 MG Tab Take  by mouth.      meloxicam (MOBIC) 15 MG tablet TAKE ONE TAB PO ONCE A DAY PRN FOR PAIN 30 Tablet 0    triamcinolone (NASACORT) 55 MCG/ACT nasal inhaler Administer 1 Spray into each nostril every day.      methocarbamol  (ROBAXIN) 750 MG Tab Take 1 Tablet by mouth 3 times a day as needed (muscle spasm). 50 Tablet 0    ondansetron (ZOFRAN ODT) 4 MG TABLET DISPERSIBLE Take 1 Tab by mouth every 6 hours as needed for Nausea/Vomiting. 10 Tab 1    diphenhydrAMINE (BENADRYL) 25 MG Tab Take 25 mg by mouth every 6 hours as needed for Sleep.      montelukast (SINGULAIR) 10 MG Tab Take 10 mg by mouth every day.      Folic Acid-Vit B6-Vit B12 (FABB PO) Take  by mouth.      Cholecalciferol (VITAMIN D3) 62893 UNITS Tab Take 1 Tab by mouth every 72 hours.      levothyroxine (SYNTHROID) 50 MCG TABS Take 50 mcg by mouth every day.      fluticasone (FLONASE) 50 MCG/ACT nasal spray Spray 1 Spray in nose every day. Each Nostril      gabapentin (NEURONTIN) 100 MG Cap       escitalopram (LEXAPRO) 20 MG tablet       methylPREDNISolone (MEDROL DOSEPAK) 4 MG Tablet Therapy Pack Follow schedule on package instructions. 21 Tablet 0    amoxicillin (AMOXIL) 500 MG Cap Take 1 Cap by mouth 3 times a day. (Patient not taking: Reported on 6/9/2025) 21 Cap 0    hydrocodone-acetaminophen (NORCO) 7.5-325 MG per tablet Take 1-2 Tabs by mouth every four hours as needed. 62 Tab 0    ranitidine (ZANTAC) 150 MG Tab Take 150 mg by mouth every day.       No current facility-administered medications for this visit.       Allergies:  Allergies   Allergen Reactions    Sulfabenzamide Anaphylaxis    Cephalexin      Other reaction(s): Not available    Nitrofurantoin Unspecified    Sulfa Drugs Anaphylaxis and Rash     Other reaction(s): Unknown       Family History:  Family History   Problem Relation Age of Onset    Hypertension Mother     Glasses Mother        Social History:  Social History     Socioeconomic History    Marital status:      Spouse name: Not on file    Number of children: Not on file    Years of education: Not on file    Highest education level: Not on file   Occupational History    Not on file   Tobacco Use    Smoking status: Never    Smokeless tobacco:  Never   Substance and Sexual Activity    Alcohol use: Yes     Comment: 2 drinks per week     Drug use: No    Sexual activity: Yes   Other Topics Concern    Not on file   Social History Narrative         Social Drivers of Health     Financial Resource Strain: Not on file   Food Insecurity: No Food Insecurity (5/29/2025)    Received from [facility]    Hunger Vital Sign     Worried About Running Out of Food in the Last Year: Never true     Ran Out of Food in the Last Year: Never true   Transportation Needs: No Transportation Needs (5/29/2025)    Received from [facility]    PRAPARE - Transportation     Lack of Transportation (Medical): No     Lack of Transportation (Non-Medical): No   Physical Activity: Insufficiently Active (5/29/2025)    Received from [facility]    Exercise Vital Sign     Days of Exercise per Week: 3 days     Minutes of Exercise per Session: 30 min   Stress: Not on file   Social Connections: Not on file   Intimate Partner Violence: Low Risk  (4/24/2024)    Received from [facility]    History of Abuse     Have you ever been afraid of, threatened, neglected, or abused by someone?: No   Housing Stability: Low Risk  (5/29/2025)    Received from [facility]    Housing Stability     What is your living situation today?: I have a steady place to live          Physical Exam:  Physical Exam    Oriented x 3  Weight/BMI: There is no height or weight on file to calculate BMI.  There were no vitals taken for this visit.    Base Eye Exam       Visual Acuity (Snellen - Linear)         Right Left    Dist sc 20/20-2 20/25    Dist ph sc 20/20 -1 20/20    Near sc J1+ J1+   Subjective improvement of blurred vision with pinhole OU             Tonometry (i care, 10:41 AM)         Right Left    Pressure 14 13              Pupils         Pupils Dark Light Shape React APD    Right PERRL 4 3 Round Brisk None    Left PERRL 4 3 Round Brisk None              Visual Fields         Right Left      Full Full              Extraocular Movement         Right Left     Full, Ortho Full, Ortho              Neuro/Psych       Oriented x3: Yes    Mood/Affect: Normal                  Additional Tests       Color         Right Left    Ishihara 9/9 9/9              Stereo       Fly: +    Animals: 3/3    Circles: 6/9                  Slit Lamp and Fundus Exam       External Exam         Right Left    External Normal, normal resistance to retropulsion Normal, normal resistance to retropulsion              Slit Lamp Exam         Right Left    Lids/Lashes Normal Normal    Conjunctiva/Sclera White and quiet White and quiet    Cornea Clear Clear    Anterior Chamber Deep and quiet Deep and quiet    Iris Round and reactive Round and reactive    Lens Clear Clear              Fundus Exam         Right Left    Disc pink, sharp disc margins, flat pink, sharp disc margins, flat    C/D Ratio 0.5 0.5    Macula Normal Normal                  Refraction       Manifest Refraction (Auto)         Sphere Cylinder Axis    Right -0.50 +0.50 169    Left -0.50 +1.00 008                    Pertinent Lab/Test/Imaging Review:  MRI brain 3/20/2016: no acute intracranial abnormality     CTH 4/24/24: no acute intracranial abnormality     TSH 2.05  FT4 1.33  Thyroid stimulating immunoglobulin 101    Assessment and Plan:     Retro-orbital pain of right eye  Reports R retrorbital pain beginning in 2024  + history of migraine headaches  R retro orbital pain improved with Qulipta initiation     Plan:   Discussed with patient that given normal examination and improvement on Qulipta, R retro-orbital pain is 2/2 migraine. She continues to have some dry eye and light sensitivity. Discussed trigeminovascular pathway and to follow up with ophthalmology for dry eye management    Thyroid disease  History of hypothyroid on synthroid 50  TSH 2.05  FT4 1.33  Thyroid stimulating immunoglobulin 101  JACI 0  S/p radiofrequency ablation     Plan:   -stable , no evidence of  thyroid orbitopathy. Continue management with Dr Dao    Chronic migraine without aura without status migrainosus, not intractable  Onset: 4-6 months ago  Frequency: initially daily, now several times a month   Characteristic: Generalized pressure  Severity: 6/10  Associated symptoms: +light/sound sensitivity, nausea,+R occipital tenderness, +neck pain   Abortive: Imitrex prn   Preventive: Magnesium 100-200mg daily, Qulipta     Plan:   Headaches significantly improved on Qulipta. Continue care with neurology. Follow up with me as needed       Dry eyes  Pt continues to struggle with dry eye symptoms despite compliance with AT. Will refer to ophthalmology for escalated care. Discussed the connection between dry eye and migraine headaches. Pt to return to neuro ophthy as needed            Kenyon Oconnor M.D.    26 total minutes were spent reviewing imaging, records, examining the patient and documenting.

## 2025-06-09 NOTE — ASSESSMENT & PLAN NOTE
Reports R retrorbital pain beginning in 2024  + history of migraine headaches  R retro orbital pain improved with Qulipta initiation     Plan:   Discussed with patient that given normal examination and improvement on Qulipta, R retro-orbital pain is 2/2 migraine. She continues to have some dry eye and light sensitivity. Discussed trigeminovascular pathway and to follow up with ophthalmology for dry eye management

## 2025-06-09 NOTE — ASSESSMENT & PLAN NOTE
Onset: 4-6 months ago  Frequency: initially daily, now several times a month   Characteristic: Generalized pressure  Severity: 6/10  Associated symptoms: +light/sound sensitivity, nausea,+R occipital tenderness, +neck pain   Abortive: Imitrex prn   Preventive: Magnesium 100-200mg daily, Qulipta     Plan:   Headaches significantly improved on Qulipta. Continue care with neurology. Follow up with me as needed

## 2025-06-09 NOTE — ASSESSMENT & PLAN NOTE
Pt continues to struggle with dry eye symptoms despite compliance with AT. Will refer to ophthalmology for escalated care. Discussed the connection between dry eye and migraine headaches. Pt to return to neuro ophthy as needed

## 2025-06-09 NOTE — ASSESSMENT & PLAN NOTE
History of hypothyroid on synthroid 50  TSH 2.05  FT4 1.33  Thyroid stimulating immunoglobulin 101  JACI 0  S/p radiofrequency ablation     Plan:   -stable , no evidence of thyroid orbitopathy. Continue management with Dr Dao

## 2025-06-12 NOTE — Clinical Note
REFERRAL APPROVAL NOTICE         Sent on June 11, 2025                   Estrella Ericka Ingramerika  [address]  [facility] [address]                   Dear Ms. Nick,    After a careful review of the medical information and benefit coverage, [facility] has processed your referral. See below for additional details.    If applicable, you must be actively enrolled with your insurance for coverage of the authorized service. If you have any questions regarding your coverage, please contact your insurance directly.    REFERRAL INFORMATION   Referral #:  [id number]  Referred-To Provider    Referred-By Provider:  Ophthalmology    Kenyon Oconnor M.D.   [facility]      [address]  [address]  [facility] [address]  [phone] [address]  [facility] [address]  [phone]    Referral Start Date:  06/09/2025  Referral End Date:   06/09/2026             SCHEDULING  If you do not already have an appointment, please call [phone] to make an appointment.     MORE INFORMATION  If you do not already have a [employer] account, sign up at: [email or web address]  You can access your medical information, make appointments, see lab results, billing information, and more.  If you have questions regarding this referral, please contact  the [facility] Referrals department at:             [phone]. Monday - Friday 8:00AM - 5:00PM.     Sincerely,    [facility]

## (undated) DEVICE — KIT ANESTHESIA W/CIRCUIT & 3/LT BAG W/FILTER (20EA/CA)

## (undated) DEVICE — CATHETER IV 20 GA X 1-1/4 ---SURG.& SDS ONLY--- (50EA/BX)

## (undated) DEVICE — MASK ANESTHESIA ADULT  - (100/CA)

## (undated) DEVICE — ELECTRODE DUAL RETURN W/ CORD - (50/PK)

## (undated) DEVICE — SET LEADWIRE 5 LEAD BEDSIDE DISPOSABLE ECG (1SET OF 5/EA)

## (undated) DEVICE — SUTURE 4-0 CHROMIC FS-2 (36EA/BX)

## (undated) DEVICE — TUBE E-T HI-LO CUFF 7.5MM (10EA/PK)

## (undated) DEVICE — HEAD HOLDER JUNIOR/ADULT

## (undated) DEVICE — BOVIE FOOT CONTROL SUCTION - 6IN 10FR (25EA/CA)

## (undated) DEVICE — GOWN WARMING STANDARD FLEX - (30/CA)

## (undated) DEVICE — SUTURE GENERAL

## (undated) DEVICE — SUTURE 3-0 ETHILON FS-1 - (36/BX) 30 INCH

## (undated) DEVICE — DRESSING NASOPORE 8CM STD - (8EA/PK)

## (undated) DEVICE — SYRINGE DISP. 12 CC LL - (100/BX)

## (undated) DEVICE — GLOVE BIOGEL SZ 7 SURGICAL PF LTX - (50PR/BX 4BX/CA)

## (undated) DEVICE — NEEDLE SPINAL NON-SAFETY 25GA X 3 (25EA/BX)"

## (undated) DEVICE — SYRINGE 10 ML CONTROL LL (25EA/BX 4BX/CA)

## (undated) DEVICE — SPLINT NASAL DOYLE AIRWAY (2EA/ST)

## (undated) DEVICE — WATER IRRIGATION STERILE 1000ML (12EA/CA)

## (undated) DEVICE — KIT  I.V. START (100EA/CA)

## (undated) DEVICE — SODIUM CHL IRRIGATION 0.9% 1000ML (12EA/CA)

## (undated) DEVICE — NEPTUNE 4 PORT MANIFOLD - (20/PK)

## (undated) DEVICE — PATTIES SURG X-RAYCOTTONOID - 1/2 X 3 IN (200/CA)

## (undated) DEVICE — TUBE CONNECTING SUCTION - CLEAR PLASTIC STERILE 72 IN (50EA/CA)

## (undated) DEVICE — PROTECTOR ULNA NERVE - (36PR/CA)

## (undated) DEVICE — SENSOR SPO2 NEO LNCS ADHESIVE (20/BX) SEE USER NOTES

## (undated) DEVICE — ANTI-FOG SOLUTION - 60BTL/CA

## (undated) DEVICE — CANISTER SUCTION 3000ML MECHANICAL FILTER AUTO SHUTOFF MEDI-VAC NONSTERILE LF DISP  (40EA/CA)

## (undated) DEVICE — BLADE STRAIGHT SINUS (5EA/PK)

## (undated) DEVICE — SUCTION INSTRUMENT YANKAUER BULBOUS TIP W/O VENT (50EA/CA)

## (undated) DEVICE — ELECTRODE 850 FOAM ADHESIVE - HYDROGEL RADIOTRNSPRNT (50/PK)

## (undated) DEVICE — GLOVE BIOGEL PI INDICATOR SZ 6.5 SURGICAL PF LF - (50/BX 4BX/CA)

## (undated) DEVICE — PACK ENT OR - (2EA/CA)

## (undated) DEVICE — LACTATED RINGERS INJ 1000 ML - (14EA/CA 60CA/PF)

## (undated) DEVICE — TUBING CLEARLINK DUO-VENT - C-FLO (48EA/CA)

## (undated) DEVICE — CANISTER SUCTION RIGID RED 1500CC (40EA/CA)

## (undated) DEVICE — MEDICINE CUP STERILE 2 OZ - (100/CA)